# Patient Record
Sex: MALE | Race: WHITE | URBAN - METROPOLITAN AREA
[De-identification: names, ages, dates, MRNs, and addresses within clinical notes are randomized per-mention and may not be internally consistent; named-entity substitution may affect disease eponyms.]

---

## 2021-12-06 ENCOUNTER — OFFICE VISIT (OUTPATIENT)
Dept: URBAN - METROPOLITAN AREA CLINIC 22 | Facility: CLINIC | Age: 71
End: 2021-12-06
Payer: MEDICARE

## 2021-12-06 DIAGNOSIS — H52.13 MYOPIA, BILATERAL: ICD-10-CM

## 2021-12-06 DIAGNOSIS — R73.03 PREDIABETES: Primary | ICD-10-CM

## 2021-12-06 DIAGNOSIS — H25.13 AGE-RELATED NUCLEAR CATARACT, BILATERAL: ICD-10-CM

## 2021-12-06 PROCEDURE — 92004 COMPRE OPH EXAM NEW PT 1/>: CPT | Performed by: STUDENT IN AN ORGANIZED HEALTH CARE EDUCATION/TRAINING PROGRAM

## 2021-12-06 PROCEDURE — 92134 CPTRZ OPH DX IMG PST SGM RTA: CPT | Performed by: STUDENT IN AN ORGANIZED HEALTH CARE EDUCATION/TRAINING PROGRAM

## 2021-12-06 ASSESSMENT — INTRAOCULAR PRESSURE
OS: 12
OD: 13

## 2021-12-06 ASSESSMENT — VISUAL ACUITY
OD: 20/25
OS: 20/25

## 2021-12-06 NOTE — IMPRESSION/PLAN
Impression: Prediabetes: R73.03. Plan: Discussed findings, no retinopathy or macular edema OU. Patient educated on importance of well-controlled blood sugar/pressure, risk of vision loss from diabetic retinopathy, and dilated eye exams. Continue management with PCP.

## 2022-10-12 ENCOUNTER — OFFICE VISIT (OUTPATIENT)
Dept: URBAN - METROPOLITAN AREA CLINIC 22 | Facility: CLINIC | Age: 72
End: 2022-10-12
Payer: MEDICARE

## 2022-10-12 DIAGNOSIS — H43.812 VITREOUS DEGENERATION, LEFT EYE: Primary | ICD-10-CM

## 2022-10-12 DIAGNOSIS — H25.13 AGE-RELATED NUCLEAR CATARACT, BILATERAL: ICD-10-CM

## 2022-10-12 PROCEDURE — 99213 OFFICE O/P EST LOW 20 MIN: CPT | Performed by: STUDENT IN AN ORGANIZED HEALTH CARE EDUCATION/TRAINING PROGRAM

## 2022-10-12 ASSESSMENT — INTRAOCULAR PRESSURE
OS: 11
OD: 10

## 2022-10-12 NOTE — IMPRESSION/PLAN
Impression: Vitreous degeneration, left eye: H43.812. Plan: Discussed findings: retina intact OU today. Reviewed signs and symptoms of retinal tear/detachment and patient educated to RTC STAT if worsening.

## 2022-11-21 ENCOUNTER — OFFICE VISIT (OUTPATIENT)
Dept: URBAN - METROPOLITAN AREA CLINIC 22 | Facility: CLINIC | Age: 72
End: 2022-11-21
Payer: MEDICARE

## 2022-11-21 DIAGNOSIS — H43.812 VITREOUS DEGENERATION, LEFT EYE: Primary | ICD-10-CM

## 2022-11-21 PROCEDURE — 99213 OFFICE O/P EST LOW 20 MIN: CPT | Performed by: STUDENT IN AN ORGANIZED HEALTH CARE EDUCATION/TRAINING PROGRAM

## 2022-11-21 NOTE — IMPRESSION/PLAN
Impression: Vitreous degeneration, left eye: H43.812. Plan: Discussed findings: retina intact OU today. Reviewed signs and symptoms of retinal tear/detachment and patient educated to RTC STAT if experiencing.